# Patient Record
Sex: MALE | Race: WHITE | NOT HISPANIC OR LATINO | Employment: FULL TIME | URBAN - METROPOLITAN AREA
[De-identification: names, ages, dates, MRNs, and addresses within clinical notes are randomized per-mention and may not be internally consistent; named-entity substitution may affect disease eponyms.]

---

## 2017-12-02 ENCOUNTER — OFFICE VISIT (OUTPATIENT)
Dept: URGENT CARE | Age: 32
End: 2017-12-02

## 2017-12-05 NOTE — PROGRESS NOTES
Assessment  1  Pain, dental (525 9) (K64 89)    Plan  Pain, dental    · Penicillin V Potassium 500 MG Oral Tablet; TAKE 1 TABLET 3 TIMES DAILY    Discussion/Summary  Discussion Summary:   Take antibiotic as directed  Recommend daily probiotic while on antibiotic  Also may continue Tylenol or ibuprofen for pain  Avoid hot or cold extremes foods and beverages  May also benefit from some over-the-counter oral of clothes in the broken tooth region to help numb nerve  Recommend get in to see dentist as soon as possible  Patient information given on [facility]    Medication Side Effects Reviewed: Possible side effects of new medications were reviewed with the patient/guardian today  Counseling Documentation With Imm: The patient was counseled regarding instructions for management  Chief Complaint    1  Dental Pain  Chief Complaint Free Text Note Form: c/o of dental pain since Tuesday- the left lower molar is the tooth that is real painful- for the last 2 days that pain was 7/10- today it's not as bad- he has information about a dentist who he plans to call      History of Present Illness  HPI: 52-year-old male with broken left lower molar that has been causing some increased pain  He feels a knot on the side of his face in the same area  No fever or chills at this time  Does not currently have a dentist      Review of Systems  Focused-Male:  Constitutional: no fever or chills, feels well, no tiredness, no recent weight loss or weight gain  ENT: no complaints of earache, no loss of hearing, no nosebleeds or nasal discharge, no sore throat or hoarseness  Cardiovascular: no complaints of slow or fast heart rate, no chest pain, no palpitations, no leg claudication or lower extremity edema  Respiratory: no complaints of shortness of breath, no wheezing or cough, no dyspnea on exertion, no orthopnea or PND    Gastrointestinal: no complaints of abdominal pain, no constipation, no nausea or vomiting, no diarrhea or bloody stools  Active Problems  1  Chemical burn of finger, right, initial encounter (944 01) (T23 421A)   2  Contusion, toe (924 3) (Y01 349L)    Past Medical History  1  History of Denial Of Any Significant Medical History   2  History of upper respiratory infection (V12 09) (Z87 09)  Active Problems And Past Medical History Reviewed: The active problems and past medical history were reviewed and updated today  Family History  Family History Reviewed: The family history was reviewed and updated today  Social History   · Alcohol Use (History)   · Denied: History of Drug Use   · Never A Smoker  Social History Reviewed: The social history was reviewed and updated today  Surgical History    1  History of Appendectomy   2  History of Knee Surgery  Surgical History Reviewed: The surgical history was reviewed and updated today  Current Meds   1  Advil CAPS; Therapy: (Recorded:50Aeo0384) to Recorded   2  Neosporin LT OINT; Therapy: (Recorded:20Sep2016) to Recorded  Medication List Reviewed: The medication list was reviewed and updated today  Allergies    1  No Known Drug Allergies    Vitals  Signs   Recorded: [id number] 09:44AM   Temperature: 97 8 F  Heart Rate: 88  Respiration: 20  Systolic: 112  Diastolic: 96  Height: 6 ft   Weight: 215 lb   BMI Calculated: 29 16  BSA Calculated: 2 2  Pain Scale: 7    Physical Exam   Constitutional  General appearance: No acute distress, well appearing and well nourished  Ears, Nose, Mouth, and Throat  Oropharynx: Abnormal  -- Broken left lower molar with surrounding gingival redness  Palpable fullness left lower jaw  Lymphatic  Palpation of lymph nodes in neck: No lymphadenopathy  Psychiatric  Orientation to person, place and time: Normal    Mood and affect: Normal        Signatures   Electronically signed by :  Laura Smith [facility]; Dec  2 2017 10:09AM EST                       (Author)    Electronically signed by : Miguel Cordero DO; Dec  3 2017  8:32PM EST                       (Co-author)

## 2018-01-23 VITALS
TEMPERATURE: 97.8 F | BODY MASS INDEX: 29.12 KG/M2 | SYSTOLIC BLOOD PRESSURE: 144 MMHG | HEIGHT: 72 IN | RESPIRATION RATE: 20 BRPM | HEART RATE: 88 BPM | DIASTOLIC BLOOD PRESSURE: 96 MMHG | WEIGHT: 215 LBS

## 2019-12-05 ENCOUNTER — OFFICE VISIT (OUTPATIENT)
Dept: URGENT CARE | Age: 34
End: 2019-12-05
Payer: COMMERCIAL

## 2019-12-05 VITALS
SYSTOLIC BLOOD PRESSURE: 140 MMHG | RESPIRATION RATE: 18 BRPM | TEMPERATURE: 99.3 F | HEIGHT: 73 IN | BODY MASS INDEX: 29.29 KG/M2 | OXYGEN SATURATION: 96 % | WEIGHT: 221 LBS | DIASTOLIC BLOOD PRESSURE: 95 MMHG | HEART RATE: 97 BPM

## 2019-12-05 DIAGNOSIS — J20.8 ACUTE BRONCHITIS DUE TO OTHER SPECIFIED ORGANISMS: Primary | ICD-10-CM

## 2019-12-05 DIAGNOSIS — J01.40 ACUTE PANSINUSITIS, RECURRENCE NOT SPECIFIED: ICD-10-CM

## 2019-12-05 PROCEDURE — G0382 LEV 3 HOSP TYPE B ED VISIT: HCPCS | Performed by: PHYSICIAN ASSISTANT

## 2019-12-05 RX ORDER — CEFDINIR 300 MG/1
300 CAPSULE ORAL EVERY 12 HOURS SCHEDULED
Qty: 14 CAPSULE | Refills: 0 | Status: SHIPPED | OUTPATIENT
Start: 2019-12-05 | End: 2019-12-12

## 2019-12-05 NOTE — PROGRESS NOTES
[facility]        NAME: Beni Zafar is a 36 y o  male  : 1985    MRN: [id number]  DATE: 2019  TIME: 1:56 PM    Assessment and Plan   Acute bronchitis due to other specified organisms [J20 8]  1  Acute bronchitis due to other specified organisms  cefdinir (OMNICEF) 300 mg capsule   2  Acute pansinusitis, recurrence not specified  cefdinir (OMNICEF) 300 mg capsule         Patient Instructions     Take antibiotics as prescribed  Use Flonase or nasal saline spray for symptomatic treatment  Stay hydrated with lots of water/fluids  Follow-up with PCP in the next few days for reexamination and to ensure resolution of symptoms  Go to the ED if any fevers, unable to stay hydrated, abdominal pain, chest pain, shortness of breath, change in voice, pain or difficulty swallowing, headaches, dizziness, vision changes, new or worsening symptoms or other concerning symptoms  Chief Complaint     Chief Complaint   Patient presents with    Cough      came home from [facility] and started wtih fever, body aches, cough, headache, night time is the worse  Taking Musinex congested, Advil and cough drops  History of Present Illness       52-year-old male presents with mild intermittent cough is mostly dry, sinus pressure, nasal congestion, postnasal drip, sore throat times 5-6 days  Has had some intermittent generalized body aches and fevers where he felt warm  States he took his temperature for the 1st time today and it was 100 4°, took Motrin with relief  Also notes a mild intermittent sinus pressure headache  States it feels like prior sinus headaches, not the worse headache he has ever had  States he has been taking Mucinex, Motrin, over-the-counter cold and sinus medication, and cough drops with significant relief  Denies any past medical history    Denies any chest pain, chest tightness, shortness of breath, GI/ symptoms, dizziness, vision changes, or other complaints  Review of Systems   Review of Systems   Constitutional: Positive for chills and fever  Negative for activity change, appetite change and fatigue  HENT: Positive for congestion, postnasal drip, rhinorrhea, sinus pressure and sore throat  Negative for ear pain, facial swelling, trouble swallowing and voice change  Eyes: Negative for itching and visual disturbance  Respiratory: Positive for cough  Negative for chest tightness, shortness of breath and wheezing  Cardiovascular: Negative for chest pain and leg swelling  Gastrointestinal: Negative for abdominal pain, diarrhea, nausea and vomiting  Musculoskeletal: Positive for myalgias (Mild, generalized body)  Negative for back pain, joint swelling, neck pain and neck stiffness  Skin: Negative for rash  Neurological: Negative for dizziness, seizures, weakness, numbness and headaches  All other systems reviewed and are negative  Current Medications       Current Outpatient Medications:     cefdinir (OMNICEF) 300 mg capsule, Take 1 capsule (300 mg total) by mouth every 12 (twelve) hours for 7 days, Disp: 14 capsule, Rfl: 0    Current Allergies     Allergies as of 12/05/2019 - Reviewed 12/05/2019   Allergen Reaction Noted    Other Eye Swelling 12/05/2019            The following portions of the patient's history were reviewed and updated as appropriate: allergies, current medications, past family history, past medical history, past social history, past surgical history and problem list      History reviewed  No pertinent past medical history  Past Surgical History:   Procedure Laterality Date    APPENDECTOMY      KNEE SURGERY         No family history on file  Medications have been verified          Objective   /95 (BP Location: Left arm, Patient Position: Sitting)   Pulse 97   Temp 99 3 °F (37 4 °C) (Temporal)   Resp 18   Ht 6' 1" (1 854 m)   Wt 100 kg (221 lb)   SpO2 96%   BMI 29 16 kg/m²        Physical Exam     Physical Exam   Constitutional: He is oriented to person, place, and time  He appears well-developed and well-nourished  No distress  HENT:   Right Ear: Tympanic membrane normal    Left Ear: Tympanic membrane normal    Mouth/Throat: Uvula is midline and mucous membranes are normal  No uvula swelling  No posterior oropharyngeal edema  Mild PND present with mildly erythematous posterior pharynx  Mild bilateral maxillary and frontal sinus pressure/discomfort to palpation  Airway patent, handling secretions  Eyes: Pupils are equal, round, and reactive to light  EOM are normal    Neck: Normal range of motion  Neck supple  Cardiovascular: Normal rate, regular rhythm and normal heart sounds  Pulmonary/Chest: Effort normal and breath sounds normal  No respiratory distress  He has no wheezes  Neurological: He is alert and oriented to person, place, and time  Skin: Capillary refill takes less than 2 seconds  Psychiatric: He has a normal mood and affect  His behavior is normal    Nursing note and vitals reviewed

## 2019-12-05 NOTE — PATIENT INSTRUCTIONS
Take antibiotics as prescribed  Use Flonase or nasal saline spray for symptomatic treatment  Stay hydrated with lots of water/fluids  Follow-up with PCP in the next few days for reexamination and to ensure resolution of symptoms  Go to the ED if any fevers, unable to stay hydrated, abdominal pain, chest pain, shortness of breath, change in voice, pain or difficulty swallowing, headaches, dizziness, vision changes, new or worsening symptoms or other concerning symptoms

## 2019-12-05 NOTE — LETTER
December 5, 2019     Patient: Obi Negro   YOB: 1985   Date of Visit: 12/5/2019       To Whom It May Concern: It is my medical opinion that Obi Negro may return to work on 12/7/19  If you have any questions or concerns, please don't hesitate to call           Sincerely,        Leslie Wasserman PA-C    CC: No Recipients

## 2022-12-19 DIAGNOSIS — Z13.79 GENETIC TESTING: Primary | ICD-10-CM

## 2023-01-11 ENCOUNTER — APPOINTMENT (OUTPATIENT)
Dept: LAB | Age: 38
End: 2023-01-11

## 2023-01-11 DIAGNOSIS — Z31.441 ENCOUNTER FOR TESTING OF MALE PARTNER OF FEMALE WITH RECURRENT PREGNANCY LOSS: ICD-10-CM

## 2023-01-11 DIAGNOSIS — B99.9 INFECTIOUS DISEASE: ICD-10-CM

## 2023-01-11 LAB
C TRACH DNA SPEC QL NAA+PROBE: NEGATIVE
HBV SURFACE AG SER QL: NORMAL
HIV 1+2 AB+HIV1 P24 AG SERPL QL IA: NORMAL
HIV 2 AB SERPL QL IA: NORMAL
HIV1 AB SERPL QL IA: NORMAL
HIV1 P24 AG SERPL QL IA: NORMAL
N GONORRHOEA DNA SPEC QL NAA+PROBE: NEGATIVE
RPR SER QL: NORMAL

## 2023-01-16 LAB
HCV RNA SERPL NAA+PROBE-ACNC: NORMAL IU/ML
TEST INFORMATION: NORMAL

## 2023-01-31 LAB
CELLS ANALYZED: 20
CELLS COUNTED AMN: 35
CELLS KARYOTYPED.TOTAL BLD/T: 2
CLINICAL CYTOGENETICIST SPEC: NORMAL
ISCN BAND LEVEL QL: 500
KARYOTYP BLD/T: NORMAL
KARYOTYP BLD/T: NORMAL
SPECIMEN SOURCE: NORMAL

## 2024-12-04 ENCOUNTER — OFFICE VISIT (OUTPATIENT)
Dept: URGENT CARE | Age: 39
End: 2024-12-04
Payer: COMMERCIAL

## 2024-12-04 VITALS
DIASTOLIC BLOOD PRESSURE: 98 MMHG | SYSTOLIC BLOOD PRESSURE: 150 MMHG | OXYGEN SATURATION: 99 % | RESPIRATION RATE: 18 BRPM | TEMPERATURE: 96.5 F | HEART RATE: 79 BPM

## 2024-12-04 DIAGNOSIS — R09.81 CONGESTION OF NASAL SINUS: ICD-10-CM

## 2024-12-04 DIAGNOSIS — B34.9 ACUTE VIRAL SYNDROME: Primary | ICD-10-CM

## 2024-12-04 PROCEDURE — G0382 LEV 3 HOSP TYPE B ED VISIT: HCPCS

## 2024-12-04 RX ORDER — BROMPHENIRAMINE MALEATE, PSEUDOEPHEDRINE HYDROCHLORIDE, AND DEXTROMETHORPHAN HYDROBROMIDE 2; 30; 10 MG/5ML; MG/5ML; MG/5ML
5 SYRUP ORAL 4 TIMES DAILY PRN
Qty: 120 ML | Refills: 0 | Status: SHIPPED | OUTPATIENT
Start: 2024-12-04

## 2024-12-04 NOTE — LETTER
December 4, 2024     Patient: Sky Sandoval   YOB: 1985   Date of Visit: 12/4/2024       To Whom it May Concern:    Sky Sandoval was seen in my clinic on 12/4/2024. He may return to work on 12/5/2024 and when fever free for 24 hours without the use of a fever-reducing agent .    If you have any questions or concerns, please don't hesitate to call.         Sincerely,          Marielena Villaseñor PA-C        CC: No Recipients

## 2024-12-04 NOTE — LETTER
December 4, 2024     Patient: Sky Sandoval   YOB: 1985   Date of Visit: 12/4/2024       To Whom it May Concern:    Sky Sandoval was seen in my clinic on 12/4/2024. Sky Sandoval was seen in my clinic on 12/4/2024. He may return to work on 12/5/2024 and when fever free for 24 hours without the use of a fever-reducing agent.    If you have any questions or concerns, please don't hesitate to call.         Sincerely,          Marielena Villaseñor PA-C        CC: No Recipients

## 2024-12-04 NOTE — PROGRESS NOTES
[facility]        NAME: Sky Sandoval is a 39 y.o. male  : 1985    MRN: [id number]  DATE: 2024  TIME: 12:47 PM    Assessment and Plan   Acute viral syndrome [B34.9]  1. Acute viral syndrome        2. Congestion of nasal sinus  brompheniramine-pseudoephedrine-DM 30-2-10 MG/5ML syrup            Patient Instructions     Start Bromfed as prescribed  Vitamin D3 2000 IU daily  Vitamin C 1000mg twice per day  Multivitamin daily  Fluids and rest  Over the counter cold medication as needed (EX: tylenol/motrin)  Follow up with PCP in 3-5 days.  Proceed to  ER if symptoms worsen.    If tests are performed, our office will contact you with results only if changes need to made to the care plan discussed with you at the visit. You [facility].    Chief Complaint     Chief Complaint   Patient presents with    Sore Throat    Cold Like Symptoms     Sore throat started on Saturday. Nasal congestion started on . Patient has difficulty sleeping and symptoms are not getting better.          History of Present Illness       Patient is a 40 yo male with no significant PMH presenting in the clinic today for cold sx x 5 days. Admits sore throat, congestion, and sinus pain/pressure. Denies fever, chills, cough, rhinorrhea, headache, dizziness, chest pain, SOB, abdominal pain, n/v/d. Admits the use of DayQuil and NyQuil for sx management. Positive recent sick contacts at home. Former social smoker 15+ years ago. Denies cardiac conditions, respiratory conditions, and DM.         Review of Systems   Review of Systems   Constitutional:  Negative for chills, fatigue and fever.   HENT:  Positive for congestion, sinus pressure, sinus pain and sore throat. Negative for ear pain, postnasal drip and rhinorrhea.    Respiratory:  Negative for cough and shortness of breath.    Cardiovascular:  Negative for chest pain.   Gastrointestinal:  Negative for abdominal pain, diarrhea,  nausea and vomiting.   Musculoskeletal:  Negative for myalgias.   Skin:  Negative for rash.   Neurological:  Negative for headaches.         Current Medications       Current Outpatient Medications:     brompheniramine-pseudoephedrine-DM 30-2-10 MG/5ML syrup, Take 5 mL by mouth 4 (four) times a day as needed for allergies, congestion or cough, Disp: 120 mL, Rfl: 0    Current Allergies     Allergies as of 12/04/2024 - Reviewed 12/04/2024   Allergen Reaction Noted    Other Eye Swelling 12/05/2019            The following portions of the patient's history were reviewed and updated as appropriate: allergies, current medications, past family history, past medical history, past social history, past surgical history and problem list.     No past medical history on file.    Past Surgical History:   Procedure Laterality Date    APPENDECTOMY      KNEE SURGERY         No family history on file.      Medications have been verified.        Objective   /98   Pulse 79   Temp (!) 96.5 °F (35.8 °C)   Resp 18   SpO2 99%        Physical Exam     Physical Exam  Vitals reviewed.   Constitutional:       General: He is not in acute distress.     Appearance: Normal appearance. He is normal weight. He is not ill-appearing.   HENT:      Head: Normocephalic.      Right Ear: Hearing, tympanic membrane, ear canal and external ear normal. No middle ear effusion. There is no impacted cerumen. Tympanic membrane is not erythematous or bulging.      Left Ear: Hearing, tympanic membrane, ear canal and external ear normal.  No middle ear effusion. There is no impacted cerumen. Tympanic membrane is not erythematous or bulging.      Nose: Congestion present. No rhinorrhea.      Right Sinus: No maxillary sinus tenderness or frontal sinus tenderness.      Left Sinus: No maxillary sinus tenderness or frontal sinus tenderness.      Mouth/Throat:      Lips: Pink.      Mouth: Mucous membranes are moist.      Pharynx: Oropharynx is clear. Uvula  midline. Posterior oropharyngeal erythema and postnasal drip present. No pharyngeal swelling, oropharyngeal exudate or uvula swelling.      Tonsils: No tonsillar exudate or tonsillar abscesses. 1+ on the right. 1+ on the left.   Eyes:      General:         Right eye: No discharge.         Left eye: No discharge.      Conjunctiva/sclera: Conjunctivae normal.   Cardiovascular:      Rate and Rhythm: Normal rate and regular rhythm.      Pulses: Normal pulses.      Heart sounds: Normal heart sounds. No murmur heard.     No friction rub. No gallop.   Pulmonary:      Effort: Pulmonary effort is normal.      Breath sounds: Normal breath sounds. No wheezing, rhonchi or rales.   Musculoskeletal:      Cervical back: Normal range of motion and neck supple. No tenderness.   Lymphadenopathy:      Cervical: No cervical adenopathy.   Skin:     General: Skin is warm.      Findings: No rash.   Neurological:      Mental Status: He is alert.   Psychiatric:         Mood and Affect: Mood normal.         Behavior: Behavior normal.

## 2024-12-04 NOTE — PATIENT INSTRUCTIONS
Start Bromfed as prescribed  Vitamin D3 2000 IU daily  Vitamin C 1000mg twice per day  Multivitamin daily  Fluids and rest  Over the counter cold medication as needed (EX: tylenol/motrin)  Follow up with PCP in 3-5 days.  Proceed to ER if symptoms worsen.